# Patient Record
Sex: MALE | Race: WHITE | URBAN - METROPOLITAN AREA
[De-identification: names, ages, dates, MRNs, and addresses within clinical notes are randomized per-mention and may not be internally consistent; named-entity substitution may affect disease eponyms.]

---

## 2017-08-29 ENCOUNTER — MYC MEDICAL ADVICE (OUTPATIENT)
Dept: FAMILY MEDICINE | Facility: CLINIC | Age: 32
End: 2017-08-29

## 2017-09-01 ENCOUNTER — OFFICE VISIT (OUTPATIENT)
Dept: FAMILY MEDICINE | Facility: CLINIC | Age: 32
End: 2017-09-01
Payer: COMMERCIAL

## 2017-09-01 VITALS
HEART RATE: 84 BPM | DIASTOLIC BLOOD PRESSURE: 81 MMHG | HEIGHT: 75 IN | SYSTOLIC BLOOD PRESSURE: 133 MMHG | WEIGHT: 201 LBS | OXYGEN SATURATION: 97 % | TEMPERATURE: 96.6 F | BODY MASS INDEX: 24.99 KG/M2

## 2017-09-01 DIAGNOSIS — F32.1 MODERATE MAJOR DEPRESSION (H): Primary | ICD-10-CM

## 2017-09-01 PROCEDURE — 99214 OFFICE O/P EST MOD 30 MIN: CPT | Performed by: PHYSICIAN ASSISTANT

## 2017-09-01 ASSESSMENT — ANXIETY QUESTIONNAIRES
7. FEELING AFRAID AS IF SOMETHING AWFUL MIGHT HAPPEN: SEVERAL DAYS
3. WORRYING TOO MUCH ABOUT DIFFERENT THINGS: SEVERAL DAYS
IF YOU CHECKED OFF ANY PROBLEMS ON THIS QUESTIONNAIRE, HOW DIFFICULT HAVE THESE PROBLEMS MADE IT FOR YOU TO DO YOUR WORK, TAKE CARE OF THINGS AT HOME, OR GET ALONG WITH OTHER PEOPLE: SOMEWHAT DIFFICULT
GAD7 TOTAL SCORE: 11
6. BECOMING EASILY ANNOYED OR IRRITABLE: NEARLY EVERY DAY
2. NOT BEING ABLE TO STOP OR CONTROL WORRYING: NEARLY EVERY DAY
1. FEELING NERVOUS, ANXIOUS, OR ON EDGE: MORE THAN HALF THE DAYS
5. BEING SO RESTLESS THAT IT IS HARD TO SIT STILL: NOT AT ALL

## 2017-09-01 ASSESSMENT — PATIENT HEALTH QUESTIONNAIRE - PHQ9
SUM OF ALL RESPONSES TO PHQ QUESTIONS 1-9: 9
5. POOR APPETITE OR OVEREATING: SEVERAL DAYS

## 2017-09-01 NOTE — NURSING NOTE
"Chief Complaint   Patient presents with     Depression       Initial /81 (BP Location: Left arm, Patient Position: Sitting, Cuff Size: Adult Regular)  Pulse 84  Temp 96.6  F (35.9  C) (Oral)  Ht 6' 3.2\" (1.91 m)  Wt 201 lb (91.2 kg)  SpO2 97%  BMI 24.99 kg/m2 Estimated body mass index is 24.99 kg/(m^2) as calculated from the following:    Height as of this encounter: 6' 3.2\" (1.91 m).    Weight as of this encounter: 201 lb (91.2 kg).  Medication Reconciliation: complete   Barry THOMAS      "

## 2017-09-01 NOTE — MR AVS SNAPSHOT
After Visit Summary   9/1/2017    Darin Marie    MRN: [id number]           Patient Information     Date Of Birth          1985        Visit Information        Provider Department      9/1/2017 4:00 PM Niya Jimenez PA-C [facility]        Today's Diagnoses     Moderate major depression (H)    -  1      Care Instructions    Based on your medical history and these are the current health maintenance or preventive care services that you are due for (some may have been done at this visit)  Health Maintenance Due   Topic Date Due     TETANUS IMMUNIZATION (SYSTEM ASSIGNED)  04/02/2003     INFLUENZA VACCINE (SYSTEM ASSIGNED)  09/01/2017         At [facility], we strive to deliver an exceptional experience to you, every time we see you.    If you receive a survey in the mail, please send us back your thoughts. We really do value your feedback.    Your care team's suggested websites for health information:  Www.Skin[employer].org : Up to date and easily searchable information on multiple topics.  Www.medlineplus.gov : medication info, interactive tutorials, watch real surgeries online  Www.familydoctor.org : good info from the Academy of Family Physicians  Www.cdc.gov : public health info, travel advisories, epidemics (H1N1)  Www.aap.org : children's health info, normal development, vaccinations  Www.health.[facility].[place].us : [place] dept of health, public health issues in [place], N1N1    How to contact your care team:   Team [facility]irit [phone]         Pharmacy [phone]    Dr. Torres, Zoila Jarrett PA-C, Eloisa Boyd APRN CNP, Niya Jimenez PA-C, Dr. Cortes, and CHRISTY Kong CNP    Team RNs: María & Henny      Clinic hours  M-Th 7 am-7 pm   Fri 7 am-5 pm.   Urgent care M-F 11 am-9 pm,   Sat/Sun 9 am-5 pm.  Pharmacy M-Th 8 am-8 pm Fri 8 am-6 pm  Sat/Sun 9 am-5 pm.     All password changes, disabled accounts, or ID changes in  "MyChart/MyHealth will be done by our Access Services Department.    If you need help with your account or password, call: [phone]. Clinic staff no longer has the ability to change passwords.             Follow-ups after your visit        Who to contact     If you have questions or need follow up information about today's clinic visit or your schedule please contact [facility] directly at [phone].  Normal or non-critical lab and imaging results will be communicated to you by MyChart, letter or phone within 4 business days after the clinic has received the results. If you do not hear from us within 7 days, please contact the clinic through [employer] or phone. If you have a critical or abnormal lab result, we will notify you by phone as soon as possible.  Submit refill requests through [employer] or call your pharmacy and they will forward the refill request to us. Please allow 3 business days for your refill to be completed.          Additional Information About Your Visit        [employer] Information     [employer] gives you secure access to your electronic health record. If you see a primary care provider, you can also send messages to your care team and make appointments. If you have questions, please call your primary care clinic.  If you do not have a primary care provider, please call [phone] and they will assist you.        Care EveryWhere ID     This is your Care EveryWhere ID. This could be used by other organizations to access your [facility] medical records  [id number]        Your Vitals Were     Pulse Temperature Height Pulse Oximetry BMI (Body Mass Index)       84 96.6  F (35.9  C) (Oral) 6' 3.2\" (1.91 m) 97% 24.99 kg/m2        Blood Pressure from Last 3 Encounters:   09/01/17 133/81   11/25/16 125/84   10/28/16 117/81    Weight from Last 3 Encounters:   09/01/17 201 lb (91.2 kg)   11/25/16 220 lb (99.8 kg)   10/28/16 214 lb (97.1 kg)              Today, you had the following  "    No orders found for display         Today's Medication Changes          These changes are accurate as of: 9/1/17  4:40 PM.  If you have any questions, ask your nurse or doctor.               Start taking these medicines.        Dose/Directions    sertraline 50 MG tablet   Commonly known as:  ZOLOFT   Used for:  Moderate major depression (H)   Started by:  Niya Jimenez PA-C        Dose:  50 mg   Take 1 tablet (50 mg) by mouth daily   Quantity:  30 tablet   Refills:  0            Where to get your medicines      [place] [facility] [place] [address]  [address], [facility] [address]     Phone:  [phone]     sertraline 50 MG tablet                Primary Care Provider Office Phone # Fax #    Niya Jimenez PA-C [phone]       [address]  [facility] [address]        Equal Access to Services     AMPARO VELAZQUEZ : Hadii esme ku hadasho Soomaali, waaxda luqadaha, qaybta kaalmada adeegyada, waxay idiin hayaan david cain . So [facility] [phone].    ATENCIÓN: Si habla español, tiene a barr disposición servicios gratuitos de asistencia lingüística. Llame al [phone].    We comply with applicable federal civil rights laws and [place] laws. We do not discriminate on the basis of race, color, national origin, age, disability sex, sexual orientation or gender identity.            Thank you!     Thank you for choosing [facility]  for your care. Our goal is always to provide you with excellent care. Hearing back from our patients is one way we can continue to improve our services. Please take a few minutes to complete the written survey that you may receive in the mail after your visit with us. Thank you!             Your Updated Medication List - Protect others around you: Learn how to safely use, store and throw away your medicines at www.disposemymeds.org.          This list is  accurate as of: 9/1/17  4:40 PM.  Always use your most recent med list.                   Brand Name Dispense Instructions for use Diagnosis    sertraline 50 MG tablet    ZOLOFT    30 tablet    Take 1 tablet (50 mg) by mouth daily    Moderate major depression (H)

## 2017-09-01 NOTE — PATIENT INSTRUCTIONS
Based on your medical history and these are the current health maintenance or preventive care services that you are due for (some may have been done at this visit)  Health Maintenance Due   Topic Date Due     TETANUS IMMUNIZATION (SYSTEM ASSIGNED)  04/02/2003     INFLUENZA VACCINE (SYSTEM ASSIGNED)  09/01/2017         At [facility], we strive to deliver an exceptional experience to you, every time we see you.    If you receive a survey in the mail, please send us back your thoughts. We really do value your feedback.    Your care team's suggested websites for health information:  Www.[employer].org : Up to date and easily searchable information on multiple topics.  Www.medlineplus.gov : medication info, interactive tutorials, watch real surgeries online  Www.familydoctor.org : good info from the Academy of Family Physicians  Www.cdc.gov : public health info, travel advisories, epidemics (H1N1)  Www.aap.org : children's health info, normal development, vaccinations  Www.health.[facility].[place].us : [place] dept of health, public health issues in [place], N1N1    How to contact your care team:   Jono Foley/[facility] [phone]         Pharmacy [phone]    Dr. Torres, Zoila Jarrett PA-C, Dr. Ferrara, Eloisa HARRISON CNP, Niya Jimenez PA-C, Dr. Cortes, and CHRISTY Kong CNP    Team RNs: María & Henny      Clinic hours  M-Th 7 am-7 pm   Fri 7 am-5 pm.   Urgent care M-F 11 am-9 pm,   Sat/Sun 9 am-5 pm.  Pharmacy M-Th 8 am-8 pm Fri 8 am-6 pm  Sat/Sun 9 am-5 pm.     All password changes, disabled accounts, or ID changes in TRELYS/MyHealth will be done by our Access Services Department.    If you need help with your account or password, call: [phone]. Clinic staff no longer has the ability to change passwords.

## 2017-09-01 NOTE — PROGRESS NOTES
"  SUBJECTIVE:   Darin Marie is a 32 year old male who presents to clinic today for the following health issues:      Abnormal Mood Symptoms      Duration: 10 month    Description:  Depression: YES  Anxiety: YES  Panic attacks: no      Accompanying signs and symptoms: see PHQ-9 and JOSEPH scores    History (similar episodes/previous evaluation): None    Precipitating or alleviating factors: None    Therapies tried and outcome: none      He has been seeing a therapist who encouraged Darin to schedule an appt to start medication as counseling is not fully treating his depression.          Problem list and histories reviewed & adjusted, as indicated.  Additional history: as documented    Patient Active Problem List   Diagnosis     Genital warts     Moderate major depression (H)     No past surgical history on file.    Social History   Substance Use Topics     Smoking status: Never Smoker     Smokeless tobacco: Not on file     Alcohol use No     No family history on file.      Current Outpatient Prescriptions   Medication Sig Dispense Refill     sertraline (ZOLOFT) 50 MG tablet Take 1 tablet (50 mg) by mouth daily 30 tablet 0     BP Readings from Last 3 Encounters:   09/01/17 133/81   11/25/16 125/84   10/28/16 117/81    Wt Readings from Last 3 Encounters:   09/01/17 201 lb (91.2 kg)   11/25/16 220 lb (99.8 kg)   10/28/16 214 lb (97.1 kg)                        Reviewed and updated as needed this visit by clinical staff     Reviewed and updated as needed this visit by Provider         ROS:  Constitutional, HEENT, cardiovascular, pulmonary, gi and gu systems are negative, except as otherwise noted.      OBJECTIVE:   /81 (BP Location: Left arm, Patient Position: Sitting, Cuff Size: Adult Regular)  Pulse 84  Temp 96.6  F (35.9  C) (Oral)  Ht 6' 3.2\" (1.91 m)  Wt 201 lb (91.2 kg)  SpO2 97%  BMI 24.99 kg/m2  Body mass index is 24.99 kg/(m^2).  GENERAL: healthy, alert and no distress    Diagnostic Test " Results:  none     ASSESSMENT/PLAN:             1. Moderate major depression (H)  Depression    Will start medication today.  We discussed various treatment options, including pros and cons of each.  I discussed in detail possible side effects of medications and that the full benefits of medication may take 4-6 weeks, patient verbalized understanding.  See [facility] for orders.    We have discussed counseling as an adjuvent to medical treatment, patient will continue with counseling    Follow-up in 4 weeks (virtual visit ok).      Patient is to follow-up sooner should symptoms change or worsen.               - sertraline (ZOLOFT) 50 MG tablet; Take 1 tablet (50 mg) by mouth daily  Dispense: 30 tablet; Refill: 0        Niya Jimenez PA-C  [facility]

## 2017-09-02 ASSESSMENT — ANXIETY QUESTIONNAIRES: GAD7 TOTAL SCORE: 11

## 2017-09-25 ENCOUNTER — E-VISIT (OUTPATIENT)
Dept: FAMILY MEDICINE | Facility: CLINIC | Age: 32
End: 2017-09-25
Payer: COMMERCIAL

## 2017-09-25 DIAGNOSIS — F32.1 MODERATE MAJOR DEPRESSION (H): ICD-10-CM

## 2017-09-25 PROCEDURE — 98969 ZZC NONPHYSICIAN ONLINE ASSESSMENT AND MANAGEMENT: CPT | Performed by: PHYSICIAN ASSISTANT

## 2017-09-25 NOTE — MR AVS SNAPSHOT
After Visit Summary   9/25/2017    Darin Marie    MRN: [id number]           Patient Information     Date Of Birth          1985        Visit Information        Provider Department      9/25/2017 7:34 AM Niya Jimenez PA-C [facility]        Today's Diagnoses     Moderate major depression (H)           Follow-ups after your visit        Who to contact     If you have questions or need follow up information about today's clinic visit or your schedule please contact [facility] directly at [phone].  Normal or non-critical lab and imaging results will be communicated to you by [employer], letter or phone within 4 business days after the clinic has received the results. If you do not hear from us within 7 days, please contact the clinic through [employer] or phone. If you have a critical or abnormal lab result, we will notify you by phone as soon as possible.  Submit refill requests through [employer] or call your pharmacy and they will forward the refill request to us. Please allow 3 business days for your refill to be completed.          Additional Information About Your Visit        MyChart Information     [employer] gives you secure access to your electronic health record. If you see a primary care provider, you can also send messages to your care team and make appointments. If you have questions, please call your primary care clinic.  If you do not have a primary care provider, please call [phone] and they will assist you.        Care EveryWhere ID     This is your Care EveryWhere ID. This could be used by other organizations to access your [facility] medical records  [id number]         Blood Pressure from Last 3 Encounters:   09/01/17 133/81   11/25/16 125/84   10/28/16 117/81    Weight from Last 3 Encounters:   09/01/17 201 lb (91.2 kg)   11/25/16 220 lb (99.8 kg)   10/28/16 214 lb (97.1 kg)              Today, you had the following     No  orders found for display         Today's Medication Changes          These changes are accurate as of: 9/25/17 10:02 AM.  If you have any questions, ask your nurse or doctor.               These medicines have changed or have updated prescriptions.        Dose/Directions    sertraline 50 MG tablet   Commonly known as:  ZOLOFT   This may have changed:  how much to take   Used for:  Moderate major depression (H)   Changed by:  Niya Jimenez PA-C        Dose:  100 mg   Take 2 tablets (100 mg) by mouth daily   Quantity:  60 tablet   Refills:  1            Where to get your medicines      These [place] [facility] [place] - [address]  [address] [facility] [address]     Phone:  [phone]     sertraline 50 MG tablet                Primary Care Provider Office Phone # Fax #    Niya Jimenez PA-C [phone]       [address]  [facility] [address]        Equal Access to Services     [facility] HospitalEFRAIN : Hadii aad ku hadasho Soomaali, waaxda luqadaha, qaybta kaalmada adeegyada, waxay idiin hayaan daeeg kharalianna cain . So [facility] [phone].    ATENCIÓN: Si habla español, tiene a barr disposición servicios gratuitos de asistencia lingüística. LlOhioHealth [facility] [phone].    We comply with applicable federal civil rights laws and [place] laws. We do not discriminate on the basis of race, color, national origin, age, disability sex, sexual orientation or gender identity.            Thank you!     Thank you for choosing [facility]  for your care. Our goal is always to provide you with excellent care. Hearing back from our patients is one way we can continue to improve our services. Please take a few minutes to complete the written survey that you may receive in the mail after your visit with us. Thank you!             Your Updated Medication List - Protect others around you: Learn how to safely use, store and throw away  your medicines at www.disposemymeds.org.          This list is accurate as of: 9/25/17 10:02 AM.  Always use your most recent med list.                   Brand Name Dispense Instructions for use Diagnosis    sertraline 50 MG tablet    ZOLOFT    60 tablet    Take 2 tablets (100 mg) by mouth daily    Moderate major depression (H)

## 2017-11-12 ENCOUNTER — OFFICE VISIT (OUTPATIENT)
Dept: URGENT CARE | Facility: URGENT CARE | Age: 32
End: 2017-11-12
Payer: COMMERCIAL

## 2017-11-12 VITALS
SYSTOLIC BLOOD PRESSURE: 141 MMHG | BODY MASS INDEX: 24.49 KG/M2 | TEMPERATURE: 98.5 F | OXYGEN SATURATION: 97 % | WEIGHT: 197 LBS | DIASTOLIC BLOOD PRESSURE: 85 MMHG | HEART RATE: 92 BPM

## 2017-11-12 DIAGNOSIS — N34.2 URETHRITIS: Primary | ICD-10-CM

## 2017-11-12 DIAGNOSIS — H10.33 ACUTE BACTERIAL CONJUNCTIVITIS OF BOTH EYES: ICD-10-CM

## 2017-11-12 LAB
ALBUMIN UR-MCNC: ABNORMAL MG/DL
APPEARANCE UR: ABNORMAL
BACTERIA #/AREA URNS HPF: ABNORMAL /HPF
BILIRUB UR QL STRIP: ABNORMAL
COLOR UR AUTO: YELLOW
GLUCOSE UR STRIP-MCNC: NEGATIVE MG/DL
HGB UR QL STRIP: NEGATIVE
KETONES UR STRIP-MCNC: 15 MG/DL
LEUKOCYTE ESTERASE UR QL STRIP: NEGATIVE
MUCOUS THREADS #/AREA URNS LPF: PRESENT /LPF
NITRATE UR QL: NEGATIVE
PH UR STRIP: 7.5 PH (ref 5–7)
RBC #/AREA URNS AUTO: ABNORMAL /HPF
SOURCE: ABNORMAL
SP GR UR STRIP: 1.02 (ref 1–1.03)
UROBILINOGEN UR STRIP-ACNC: 0.2 EU/DL (ref 0.2–1)
WBC #/AREA URNS AUTO: ABNORMAL /HPF

## 2017-11-12 PROCEDURE — 87389 HIV-1 AG W/HIV-1&-2 AB AG IA: CPT | Performed by: FAMILY MEDICINE

## 2017-11-12 PROCEDURE — 87491 CHLMYD TRACH DNA AMP PROBE: CPT | Performed by: FAMILY MEDICINE

## 2017-11-12 PROCEDURE — 81001 URINALYSIS AUTO W/SCOPE: CPT | Performed by: FAMILY MEDICINE

## 2017-11-12 PROCEDURE — 87591 N.GONORRHOEAE DNA AMP PROB: CPT | Performed by: FAMILY MEDICINE

## 2017-11-12 PROCEDURE — 99214 OFFICE O/P EST MOD 30 MIN: CPT | Performed by: FAMILY MEDICINE

## 2017-11-12 PROCEDURE — 36415 COLL VENOUS BLD VENIPUNCTURE: CPT | Performed by: FAMILY MEDICINE

## 2017-11-12 PROCEDURE — 87086 URINE CULTURE/COLONY COUNT: CPT | Performed by: FAMILY MEDICINE

## 2017-11-12 PROCEDURE — 86780 TREPONEMA PALLIDUM: CPT | Performed by: FAMILY MEDICINE

## 2017-11-12 RX ORDER — CEFTRIAXONE SODIUM 250 MG/1
250 INJECTION, POWDER, FOR SOLUTION INTRAMUSCULAR; INTRAVENOUS ONCE
Qty: 1.25 ML | Refills: 0 | OUTPATIENT
Start: 2017-11-12 | End: 2017-11-12

## 2017-11-12 RX ORDER — GENTAMICIN SULFATE 3 MG/ML
1 SOLUTION/ DROPS OPHTHALMIC EVERY 4 HOURS
Qty: 5 ML | Refills: 0 | Status: SHIPPED | OUTPATIENT
Start: 2017-11-12 | End: 2017-11-19

## 2017-11-12 RX ORDER — POLYMYXIN B SULFATE AND TRIMETHOPRIM 1; 10000 MG/ML; [USP'U]/ML
SOLUTION OPHTHALMIC
Refills: 0 | COMMUNITY
Start: 2017-11-09

## 2017-11-12 RX ORDER — AZITHROMYCIN 500 MG/1
1000 TABLET, FILM COATED ORAL ONCE
Qty: 2 TABLET | Refills: 0 | Status: SHIPPED | OUTPATIENT
Start: 2017-11-12 | End: 2017-11-12

## 2017-11-12 NOTE — NURSING NOTE
The following medication was given:     MEDICATION: Rocephin 250 mg and Lidocaine 1cc  ROUTE: IM  SITE: Ventrogluteal - Right  DOSE: 250mg  LOT #: [id number]  :  [employer] for [employer]  EXPIRATION DATE:  3-1-20  NDC#: 2581-2616-42    Lluvia Prieto MA

## 2017-11-12 NOTE — PROGRESS NOTES
SUBJECTIVE:   Darin Marie is a 32 year old male who presents to clinic today for the following health issues:      Eye(s) Problem      Duration: Since this past Thursday    Description:  Location: bilateral  Pain: YES- feels like constant dry eyes  Redness: YES  Discharge: YES- in the am very crusty and liquidy    Accompanying signs and symptoms: redness, swelling, discharge    History (Trauma, foreign body exposure,): Yes - Dx w/ pink eye, on Wed felt something was in his eye    Precipitating or alleviating factors (contact use): None    Therapies tried and outcome: Antibiotic eye drops    The patient does not wear corrective lenses  Right Eye 20/50   Left Eye 20/20   Both Eyes 20/20           Genitourinary symptoms      Duration: Since this past Thursday    Description:  dysuria and discharge    Intensity:  8/10 pain on urination    Accompanying signs and symptoms (fever/discharge/nausea/vomiting/back or abdominal pain):  White milky discharge, pain    History (frequent UTI's/kidney stones/prostate problems): None  Sexually active: YES- multiple partners, some with protection some not    Precipitating or alleviating factors: None - not going to the bathroom makes things better    Therapies tried and outcome: none   Outcome: n/a    Sexually active, 3 sexual partners in last month, 15 overall in his lifetime, use condoms sometimes, heterosexual, no anal intercourse, no STD, teacher by profession, denies smoking cigarette, drinks alcohol occasionally         Problem list and histories reviewed & adjusted, as indicated.  Additional history: as documented    Patient Active Problem List   Diagnosis     Genital warts     Moderate major depression (H)     No past surgical history on file.    Social History   Substance Use Topics     Smoking status: Never Smoker     Smokeless tobacco: Not on file     Alcohol use No     No family history on file.      Current Outpatient Prescriptions   Medication Sig Dispense  Refill     trimethoprim-polymyxin b (POLYTRIM) ophthalmic solution   0     sertraline (ZOLOFT) 50 MG tablet Take 2 tablets (100 mg) by mouth daily 60 tablet 1     Allergies   Allergen Reactions     Penicillins      REACTION UNKNOWN     No lab results found.   BP Readings from Last 3 Encounters:   11/12/17 141/85   09/01/17 133/81   11/25/16 125/84    Wt Readings from Last 3 Encounters:   11/12/17 197 lb (89.4 kg)   09/01/17 201 lb (91.2 kg)   11/25/16 220 lb (99.8 kg)                  Labs reviewed in EPIC          Reviewed and updated as needed this visit by clinical staffTobacco  Allergies  Meds       Reviewed and updated as needed this visit by Provider         ROS:   ROS: 10 point ROS neg other than the symptoms noted above in the HPI.      OBJECTIVE:   /85 (BP Location: Left arm, Patient Position: Chair, Cuff Size: Adult Regular)  Pulse 92  Temp 98.5  F (36.9  C) (Oral)  Wt 197 lb (89.4 kg)  SpO2 97%  BMI 24.49 kg/m2  Body mass index is 24.49 kg/(m^2).  GENERAL: healthy, alert and no distress  EYES: PERRL, EOMI and conjunctiva/corneas- conjunctival injection and watering discharge bilaterally   NECK: no adenopathy, no asymmetry, masses, or scars and thyroid normal to palpation  RESP: lungs clear to auscultation - no rales, rhonchi or wheezes  CV: regular rate and rhythm, normal S1 S2, no S3 or S4, no murmur, click or rub, no peripheral edema and peripheral pulses strong  ABDOMEN: soft, nontender, no hepatosplenomegaly, no masses and bowel sounds normal   (male): testicles normal without atrophy or masses and light yellowish penile discharge, no genital lesions or lymphadenopathy noted   MS: no gross musculoskeletal defects noted, no edema  NEURO: Normal strength and tone, mentation intact and speech normal    Diagnostic Test Results:  Results for orders placed or performed in visit on 11/12/17 (from the past 24 hour(s))   *UA reflex to Microscopic and Culture ([facility] (except  [place] and [place])   Result Value Ref Range    Color Urine Yellow     Appearance Urine Slightly Cloudy     Glucose Urine Negative NEG^Negative mg/dL    Bilirubin Urine Small (A) NEG^Negative    Ketones Urine 15 (A) NEG^Negative mg/dL    Specific Gravity Urine 1.020 1.003 - 1.035    Blood Urine Negative NEG^Negative    pH Urine 7.5 (H) 5.0 - 7.0 pH    Protein Albumin Urine Trace (A) NEG^Negative mg/dL    Urobilinogen Urine 0.2 0.2 - 1.0 EU/dL    Nitrite Urine Negative NEG^Negative    Leukocyte Esterase Urine Negative NEG^Negative    Source Midstream Urine    Urine Microscopic   Result Value Ref Range    WBC Urine  (A) OTO2^O - 2 /HPF    RBC Urine O - 2 OTO2^O - 2 /HPF    Bacteria Urine Moderate (A) NEG^Negative /HPF    Mucous Urine Present (A) NEG^Negative /LPF       ASSESSMENT/PLAN:       1. Urethritis  - Symptoms likely related to STD, ceftriaxone given and azithromycin ordered   - Written information provided as below  - Follow up with PCP next week   - *UA reflex to Microscopic and Culture ([facility] (except [place] and [place])  - Urine Microscopic  - Urine Culture Aerobic Bacterial  - Chlamydia/gonorhea PCR   - cefTRIAXone (ROCEPHIN) 250 MG injection; Inject 250 mg into the muscle once for 1 dose  Dispense: 1.25 mL; Refill: 0  - azithromycin (ZITHROMAX) 500 MG tablet; Take 2 tablets (1,000 mg) by mouth once for 1 dose  Dispense: 2 tablet; Refill: 0      2. Acute bacterial conjunctivitis of both eyes  - Possibly STD related   - Gentamicin drops prescribed, suggested warm compresses and regular eye wash   - gentamicin (GARAMYCIN) 0.3 % ophthalmic solution; Apply 1 drop to eye every 4 hours for 7 days  Dispense: 5 mL; Refill: 0      MEDICATIONS:   Orders Placed This Encounter   Medications     trimethoprim-polymyxin b (POLYTRIM) ophthalmic solution     Refill:  0     cefTRIAXone (ROCEPHIN) 250 MG injection     Sig: Inject 250 mg into the muscle once for 1 dose     Dispense:  1.25  mL     Refill:  0     azithromycin (ZITHROMAX) 500 MG tablet     Sig: Take 2 tablets (1,000 mg) by mouth once for 1 dose     Dispense:  2 tablet     Refill:  0     gentamicin (GARAMYCIN) 0.3 % ophthalmic solution     Sig: Apply 1 drop to eye every 4 hours for 7 days     Dispense:  5 mL     Refill:  0     Patient Instructions     You and sexual partners should be abstaining from sexual activity until the therapeutic course of antibiotics is completed.   Your partner should be getting treatment as well.   You should have STD screen retesting in 3 months.   Follow up with PCP next week or earlier if needed.     Urethritis in Men     An inflamed urethra can cause pain during urination.   The urethra is the tube that runs from the bladder through the penis. When the urethra is inflamed, it is called urethritis. The urethra becomes swollen and causes burning pain when you urinate. Other symptoms of urethritis may include itching or tingling of the penis or pus discharge from the penis. You may also have pain with sex and masturbation. Some men may not have symptoms.  What causes urethritis?  Urethritis can be caused by a bacterial or viral infection. Such an infection can lead to conditions such as a urinary tract infection (UTI) or sexually transmitted diseases (STD). Urethritis can also be caused by injury or sensitivity or allergy to chemicals in lotions and other products.  How is urethritis diagnosed?  Your healthcare provider will examine you and ask about your symptoms and health history. You may also have one or more of the following tests:    Urine test to take samples of urine and have them checked for problems.    Blood test to take a sample of blood and have it checked for problems.    Urethral discharge to take a sample of fluid from inside the urethra. A cotton swab is inserted into the opening of the penis and into the urethra.    Cystoscopy to allow the healthcare provider to look for problems in the  urinary tract. The test uses a thin, flexible telescope called a cystoscope with a light and camera attached. The scope is inserted into the urethra.  How is urethritis treated?  Treatment depends on the cause of urethritis. If it s due to a bacterial infection, antibiotics (medicines that fight infection) will be given. Your healthcare provider can tell you more about your treatment options. In the meantime, your symptoms can be treated. To relieve pain and swelling, anti-inflammatory medications, such as ibuprofen, may be given. Untreated, symptoms may get worse. Also, scar tissue can form in the urethra, causing it to narrow.  When to call your healthcare provider   Call your healthcare provider right away if you have any of the following:    Fever of 100.4 F (38.0 C) or higher     Blood in the urine or semen    Burning pain with urination    Increased urge to urinate    Discharge from the penis    Itching, tenderness, or swelling in the penis or groin    Pain during sex or masturbation   Preventing STDs  When it comes to sex, it s important to take care and be safe. Any sexual contact with the penis, vagina, anus, or mouth can spread an STD. The only sure way to prevent STDs is not having sex. But there are ways to make sex safer. Use a latex condom each time you have sex. And talk to your partner about STDs before you have sex.  Date Last Reviewed: 1/1/2017 2000-2017 [employer]. [address] [place], [place] [id number]. All rights reserved. This information is not intended as a substitute for professional medical care. Always follow your healthcare professional's instructions.        Urethritis Due to Gonorrhea or Chlamydia (Adult male)    You have urethritis. This is an inflammation in the urethra. The urethra is the tube between the bladder and the tip of the penis. Urine drains out of the body through the urethra. There are 2 main types of this condition:    Gonococcal urethritis () is  an infection caused by gonorrhea.    Non-gonococcal urethritis (MILO) is an infection that is usually caused by chlamydia. Other infections can also be the cause.  Women often have no symptoms. Men are more likely to have symptoms, but may not. Symptoms can start within 1 week after infection, but can take a month or more, if they even occur. Some symptoms are:    Burning or pain when urinating    Irritation in the penis    Pus discharge from the penis    Pain and possible swelling in one or both testicles  Infections in the urethra are usually caused by sexually transmitted diseases, or STDs. The most common infections are gonorrhea, chlamydia, or both.  Gonorrhea infections  Gonococcal urethritis () is an infection of the urethra. It is caused by gonorrhea. Gonorrhea is a sexually transmitted infection (STI). Gonorrhea can also be in other areas of the body. This can cause:    Rectal pain and discharge    Throat infection    Eye infections (conjunctivitis)  Without treatment, the infection can get worse and spread to other parts of your body. The infection can cause rashes, arthritis, and infections in your joints, heart, and brain.  Non-gonococcal infections, or MILO infections  Non-gonococcal urethritis (MILO) is an infection of the urethra. It is usually caused by chlamydia. Symptoms may clear up in a few weeks or months, even without treatment. However, without treatment, the bacteria that cause MILO can stay in the urethra. This means that even if symptoms clear, you can still have an infection. You can spread it to others if you are not treated.  Urethritis caused by an infection can be cured, but it needs to be treated with antibiotics. If you don't get treated, you can give it to someone else. If you give it to a woman, it can cause a serious pelvic infection and infertility.  It is important to remember that you can have an infection without symptoms. For this reason, your sexual partner(s) needs to be  treated, even if he or she has no symptoms. If he or she is not treated, and you continue to have sex, you will be infected again. Your partner(s) should contact his or her own healthcare provider to be examined and treated. An urgent care clinic or the Public Health Department can also do this.  Home care  The following guidelines will help you care for yourself at home:    Take all the antibiotics you were given until they are used up. It is important to finish them, even if you are feeling better. This ensures the infection is completely cleared up.    No sex until both you and your partner(s) have finished all the antibiotics, and your healthcare provider says you are no longer contagious.    You can take acetaminophen or ibuprofen for pain, unless you were given a different pain medicine. If you have chronic liver or kidney disease or have ever had a stomach ulcer or GI bleeding, or are taking blood thinners, talk with your healthcare provider before using these medicines.    Aspirin should never be used in anyone under 18 years of age who has a fever.    Learn about and use safe sex practices. The safest sex is with a partner who has tested negative and only has sex with you. Condoms can keep some STDs from spreading, including gonorrhea, chlamydia and HIV, but are not a guarantee.  Follow-up care  Follow up with your healthcare provider, or as advised. If a culture test was taken, you may call for the results as directed. Another culture test should be done 4 to 6 weeks after treatment to be sure the infection is gone. Follow up with your healthcare provider or the Public Health Department for a complete STD screening, including HIV testing. For more information about STDs, contact CDC-INFO at 501-416-5627.  When to seek medical advice  Call your healthcare provider right away if any of these occur:    No improvement after 3 days of treatment, although you can have some symptoms longer    Unable to  urinate    Rash or joint pain    Painful sores on the penis    Enlarged painful lymph nodes (lumps) in the groin    Testicle pain or swelling of the scrotum  Date Last Reviewed: 10/1/2016    2823-2499 [employer]. [address] [place] [address]. All rights reserved. This information is not intended as a substitute for professional medical care. Always follow your healthcare professional's instructions.        Conjunctivitis, Bacterial    You have an infection in the membranes covering the white part of the eye. This part of the eye is called the conjunctiva. The infection is called conjunctivitis. The most common symptoms of conjunctivitis include a thick, pus-like discharge from the eye, swollen eyelids, redness, eyelids sticking together upon awakening, and a gritty or scratchy feeling in the eye. Your infection was caused by bacteria. It may be treated with medicine. With treatment, the infection takes about 7 to 10 days to resolve.  Home care    Use prescribed antibiotic eye drops or ointment as directed to treat the infection.    Apply a warm compress (towel soaked in warm water) to the affected eye 3 to 4 times a day. Do this just before applying medicine to the eye.    Use a warm, wet cloth to wipe away crusting of the eyelids in the morning. This is caused by mucus drainage during the night. You may also use saline irrigating solution or artificial tears to rinse away mucus in the eye. Do not put a patch over the eye.    Wash your hands before and after touching the infected eye. This is to prevent spreading the infection to the other eye, and to other people. Don't share your towels or washcloths with others.    You may use acetaminophen or ibuprofen to control pain, unless another medicine was prescribed. (Note: If you have chronic liver or kidney disease or have ever had a stomach ulcer or gastrointestinal bleeding, talk with your doctor before using these medicines.)    Don't  wear contact lenses until your eyes have healed and all symptoms are gone.  Follow-up care  Follow up with your healthcare provider, or as advised.  When to seek medical advice  Call your healthcare provider right away if any of these occur:    Worsening vision    Increasing pain in the eye    Increasing swelling or redness of the eyelid    Redness spreading around the eye  Date Last Reviewed: 6/14/2015 2000-2017 [employer]. [address] [place] [address]. All rights reserved. This information is not intended as a substitute for professional medical care. Always follow your healthcare professional's instructions.            Bert Ray MD  [facility]

## 2017-11-12 NOTE — NURSING NOTE
"Chief Complaint   Patient presents with     Conjunctivitis     dx this past Thursday via virtual site, got rx, eyes have not gotten any better, same if not worse     Urinary Problem     pain when he urinates, some milky cloudy discharge, symptoms present since Thursday as well, no blood in urine       Initial /85 (BP Location: Left arm, Patient Position: Chair, Cuff Size: Adult Regular)  Pulse 92  Temp 98.5  F (36.9  C) (Oral)  Wt 197 lb (89.4 kg)  SpO2 97%  BMI 24.49 kg/m2 Estimated body mass index is 24.49 kg/(m^2) as calculated from the following:    Height as of 9/1/17: 6' 3.2\" (1.91 m).    Weight as of this encounter: 197 lb (89.4 kg).  Medication Reconciliation: complete   Lluvia Prieto MA    "

## 2017-11-12 NOTE — PATIENT INSTRUCTIONS
You and sexual partners should be abstaining from sexual activity until the therapeutic course of antibiotics is completed.   Your partner should be getting treatment as well.   You should have STD screen retesting in 3 months.   Follow up with PCP next week or earlier if needed.     Urethritis in Men     An inflamed urethra can cause pain during urination.   The urethra is the tube that runs from the bladder through the penis. When the urethra is inflamed, it is called urethritis. The urethra becomes swollen and causes burning pain when you urinate. Other symptoms of urethritis may include itching or tingling of the penis or pus discharge from the penis. You may also have pain with sex and masturbation. Some men may not have symptoms.  What causes urethritis?  Urethritis can be caused by a bacterial or viral infection. Such an infection can lead to conditions such as a urinary tract infection (UTI) or sexually transmitted diseases (STD). Urethritis can also be caused by injury or sensitivity or allergy to chemicals in lotions and other products.  How is urethritis diagnosed?  Your healthcare provider will examine you and ask about your symptoms and health history. You may also have one or more of the following tests:    Urine test to take samples of urine and have them checked for problems.    Blood test to take a sample of blood and have it checked for problems.    Urethral discharge to take a sample of fluid from inside the urethra. A cotton swab is inserted into the opening of the penis and into the urethra.    Cystoscopy to allow the healthcare provider to look for problems in the urinary tract. The test uses a thin, flexible telescope called a cystoscope with a light and camera attached. The scope is inserted into the urethra.  How is urethritis treated?  Treatment depends on the cause of urethritis. If it s due to a bacterial infection, antibiotics (medicines that fight infection) will be given. Your  healthcare provider can tell you more about your treatment options. In the meantime, your symptoms can be treated. To relieve pain and swelling, anti-inflammatory medications, such as ibuprofen, may be given. Untreated, symptoms may get worse. Also, scar tissue can form in the urethra, causing it to narrow.  When to call your healthcare provider   Call your healthcare provider right away if you have any of the following:    Fever of 100.4 F (38.0 C) or higher     Blood in the urine or semen    Burning pain with urination    Increased urge to urinate    Discharge from the penis    Itching, tenderness, or swelling in the penis or groin    Pain during sex or masturbation   Preventing STDs  When it comes to sex, it s important to take care and be safe. Any sexual contact with the penis, vagina, anus, or mouth can spread an STD. The only sure way to prevent STDs is not having sex. But there are ways to make sex safer. Use a latex condom each time you have sex. And talk to your partner about STDs before you have sex.  Date Last Reviewed: 1/1/2017 2000-2017 [employer]. [address] [place] [address]. All rights reserved. This information is not intended as a substitute for professional medical care. Always follow your healthcare professional's instructions.        Urethritis Due to Gonorrhea or Chlamydia (Adult male)    You have urethritis. This is an inflammation in the urethra. The urethra is the tube between the bladder and the tip of the penis. Urine drains out of the body through the urethra. There are 2 main types of this condition:    Gonococcal urethritis () is an infection caused by gonorrhea.    Non-gonococcal urethritis (MILO) is an infection that is usually caused by chlamydia. Other infections can also be the cause.  Women often have no symptoms. Men are more likely to have symptoms, but may not. Symptoms can start within 1 week after infection, but can take a month or more, if  they even occur. Some symptoms are:    Burning or pain when urinating    Irritation in the penis    Pus discharge from the penis    Pain and possible swelling in one or both testicles  Infections in the urethra are usually caused by sexually transmitted diseases, or STDs. The most common infections are gonorrhea, chlamydia, or both.  Gonorrhea infections  Gonococcal urethritis () is an infection of the urethra. It is caused by gonorrhea. Gonorrhea is a sexually transmitted infection (STI). Gonorrhea can also be in other areas of the body. This can cause:    Rectal pain and discharge    Throat infection    Eye infections (conjunctivitis)  Without treatment, the infection can get worse and spread to other parts of your body. The infection can cause rashes, arthritis, and infections in your joints, heart, and brain.  Non-gonococcal infections, or MILO infections  Non-gonococcal urethritis (MILO) is an infection of the urethra. It is usually caused by chlamydia. Symptoms may clear up in a few weeks or months, even without treatment. However, without treatment, the bacteria that cause MILO can stay in the urethra. This means that even if symptoms clear, you can still have an infection. You can spread it to others if you are not treated.  Urethritis caused by an infection can be cured, but it needs to be treated with antibiotics. If you don't get treated, you can give it to someone else. If you give it to a woman, it can cause a serious pelvic infection and infertility.  It is important to remember that you can have an infection without symptoms. For this reason, your sexual partner(s) needs to be treated, even if he or she has no symptoms. If he or she is not treated, and you continue to have sex, you will be infected again. Your partner(s) should contact his or her own healthcare provider to be examined and treated. An urgent care clinic or the Public Health Department can also do this.  Home care  The following  guidelines will help you care for yourself at home:    Take all the antibiotics you were given until they are used up. It is important to finish them, even if you are feeling better. This ensures the infection is completely cleared up.    No sex until both you and your partner(s) have finished all the antibiotics, and your healthcare provider says you are no longer contagious.    You can take acetaminophen or ibuprofen for pain, unless you were given a different pain medicine. If you have chronic liver or kidney disease or have ever had a stomach ulcer or GI bleeding, or are taking blood thinners, talk with your healthcare provider before using these medicines.    Aspirin should never be used in anyone under 18 years of age who has a fever.    Learn about and use safe sex practices. The safest sex is with a partner who has tested negative and only has sex with you. Condoms can keep some STDs from spreading, including gonorrhea, chlamydia and HIV, but are not a guarantee.  Follow-up care  Follow up with your healthcare provider, or as advised. If a culture test was taken, you may call for the results as directed. Another culture test should be done 4 to 6 weeks after treatment to be sure the infection is gone. Follow up with your healthcare provider or the Public Health Department for a complete STD screening, including HIV testing. For more information about STDs, contact CDC-INFO at 051-740-1244.  When to seek medical advice  Call your healthcare provider right away if any of these occur:    No improvement after 3 days of treatment, although you can have some symptoms longer    Unable to urinate    Rash or joint pain    Painful sores on the penis    Enlarged painful lymph nodes (lumps) in the groin    Testicle pain or swelling of the scrotum  Date Last Reviewed: 10/1/2016    9785-8092 [employer]. [address] [place] [address], [place] [address]. All rights reserved. This information is not intended as a  substitute for professional medical care. Always follow your healthcare professional's instructions.        Conjunctivitis, Bacterial    You have an infection in the membranes covering the white part of the eye. This part of the eye is called the conjunctiva. The infection is called conjunctivitis. The most common symptoms of conjunctivitis include a thick, pus-like discharge from the eye, swollen eyelids, redness, eyelids sticking together upon awakening, and a gritty or scratchy feeling in the eye. Your infection was caused by bacteria. It may be treated with medicine. With treatment, the infection takes about 7 to 10 days to resolve.  Home care    Use prescribed antibiotic eye drops or ointment as directed to treat the infection.    Apply a warm compress (towel soaked in warm water) to the affected eye 3 to 4 times a day. Do this just before applying medicine to the eye.    Use a warm, wet cloth to wipe away crusting of the eyelids in the morning. This is caused by mucus drainage during the night. You may also use saline irrigating solution or artificial tears to rinse away mucus in the eye. Do not put a patch over the eye.    Wash your hands before and after touching the infected eye. This is to prevent spreading the infection to the other eye, and to other people. Don't share your towels or washcloths with others.    You may use acetaminophen or ibuprofen to control pain, unless another medicine was prescribed. (Note: If you have chronic liver or kidney disease or have ever had a stomach ulcer or gastrointestinal bleeding, talk with your doctor before using these medicines.)    Don't wear contact lenses until your eyes have healed and all symptoms are gone.  Follow-up care  Follow up with your healthcare provider, or as advised.  When to seek medical advice  Call your healthcare provider right away if any of these occur:    Worsening vision    Increasing pain in the eye    Increasing swelling or redness of the  eyelid    Redness spreading around the eye  Date Last Reviewed: 6/14/2015 2000-2017 [employer]. [address] [place] [address], [place] [address]. All rights reserved. This information is not intended as a substitute for professional medical care. Always follow your healthcare professional's instructions.

## 2017-11-12 NOTE — MR AVS SNAPSHOT
After Visit Summary   11/12/2017    Darin Marie    MRN: [id number]           Patient Information     Date Of Birth          1985        Visit Information        Provider Department      11/12/2017 10:05 AM Bert Ray MD [facility]        Today's Diagnoses     Urethritis    -  1    Dysuria        Nonspecific finding on examination of urine        Acute bacterial conjunctivitis of both eyes          Care Instructions    You and sexual partners should be abstaining from sexual activity until the therapeutic course of antibiotics is completed.   Your partner should be getting treatment as well.   You should have STD screen retesting in 3 months.   Follow up with PCP next week or earlier if needed.     Urethritis in Men     An inflamed urethra can cause pain during urination.   The urethra is the tube that runs from the bladder through the penis. When the urethra is inflamed, it is called urethritis. The urethra becomes swollen and causes burning pain when you urinate. Other symptoms of urethritis may include itching or tingling of the penis or pus discharge from the penis. You may also have pain with sex and masturbation. Some men may not have symptoms.  What causes urethritis?  Urethritis can be caused by a bacterial or viral infection. Such an infection can lead to conditions such as a urinary tract infection (UTI) or sexually transmitted diseases (STD). Urethritis can also be caused by injury or sensitivity or allergy to chemicals in lotions and other products.  How is urethritis diagnosed?  Your healthcare provider will examine you and ask about your symptoms and health history. You may also have one or more of the following tests:    Urine test to take samples of urine and have them checked for problems.    Blood test to take a sample of blood and have it checked for problems.    Urethral discharge to take a sample of fluid from inside the urethra. A cotton swab  is inserted into the opening of the penis and into the urethra.    Cystoscopy to allow the healthcare provider to look for problems in the urinary tract. The test uses a thin, flexible telescope called a cystoscope with a light and camera attached. The scope is inserted into the urethra.  How is urethritis treated?  Treatment depends on the cause of urethritis. If it s due to a bacterial infection, antibiotics (medicines that fight infection) will be given. Your healthcare provider can tell you more about your treatment options. In the meantime, your symptoms can be treated. To relieve pain and swelling, anti-inflammatory medications, such as ibuprofen, may be given. Untreated, symptoms may get worse. Also, scar tissue can form in the urethra, causing it to narrow.  When to call your healthcare provider   Call your healthcare provider right away if you have any of the following:    Fever of 100.4 F (38.0 C) or higher     Blood in the urine or semen    Burning pain with urination    Increased urge to urinate    Discharge from the penis    Itching, tenderness, or swelling in the penis or groin    Pain during sex or masturbation   Preventing STDs  When it comes to sex, it s important to take care and be safe. Any sexual contact with the penis, vagina, anus, or mouth can spread an STD. The only sure way to prevent STDs is not having sex. But there are ways to make sex safer. Use a latex condom each time you have sex. And talk to your partner about STDs before you have sex.  Date Last Reviewed: 1/1/2017 2000-2017 [employer]. [address] [place] [address], [place] [address]. All rights reserved. This information is not intended as a substitute for professional medical care. Always follow your healthcare professional's instructions.        Urethritis Due to Gonorrhea or Chlamydia (Adult male)    You have urethritis. This is an inflammation in the urethra. The urethra is the tube between the bladder and the tip  of the penis. Urine drains out of the body through the urethra. There are 2 main types of this condition:    Gonococcal urethritis () is an infection caused by gonorrhea.    Non-gonococcal urethritis (MILO) is an infection that is usually caused by chlamydia. Other infections can also be the cause.  Women often have no symptoms. Men are more likely to have symptoms, but may not. Symptoms can start within 1 week after infection, but can take a month or more, if they even occur. Some symptoms are:    Burning or pain when urinating    Irritation in the penis    Pus discharge from the penis    Pain and possible swelling in one or both testicles  Infections in the urethra are usually caused by sexually transmitted diseases, or STDs. The most common infections are gonorrhea, chlamydia, or both.  Gonorrhea infections  Gonococcal urethritis () is an infection of the urethra. It is caused by gonorrhea. Gonorrhea is a sexually transmitted infection (STI). Gonorrhea can also be in other areas of the body. This can cause:    Rectal pain and discharge    Throat infection    Eye infections (conjunctivitis)  Without treatment, the infection can get worse and spread to other parts of your body. The infection can cause rashes, arthritis, and infections in your joints, heart, and brain.  Non-gonococcal infections, or MILO infections  Non-gonococcal urethritis (MILO) is an infection of the urethra. It is usually caused by chlamydia. Symptoms may clear up in a few weeks or months, even without treatment. However, without treatment, the bacteria that cause MILO can stay in the urethra. This means that even if symptoms clear, you can still have an infection. You can spread it to others if you are not treated.  Urethritis caused by an infection can be cured, but it needs to be treated with antibiotics. If you don't get treated, you can give it to someone else. If you give it to a woman, it can cause a serious pelvic infection and  infertility.  It is important to remember that you can have an infection without symptoms. For this reason, your sexual partner(s) needs to be treated, even if he or she has no symptoms. If he or she is not treated, and you continue to have sex, you will be infected again. Your partner(s) should contact his or her own healthcare provider to be examined and treated. An urgent care clinic or the Public Health Department can also do this.  Home care  The following guidelines will help you care for yourself at home:    Take all the antibiotics you were given until they are used up. It is important to finish them, even if you are feeling better. This ensures the infection is completely cleared up.    No sex until both you and your partner(s) have finished all the antibiotics, and your healthcare provider says you are no longer contagious.    You can take acetaminophen or ibuprofen for pain, unless you were given a different pain medicine. If you have chronic liver or kidney disease or have ever had a stomach ulcer or GI bleeding, or are taking blood thinners, talk with your healthcare provider before using these medicines.    Aspirin should never be used in anyone under 18 years of age who has a fever.    Learn about and use safe sex practices. The safest sex is with a partner who has tested negative and only has sex with you. Condoms can keep some STDs from spreading, including gonorrhea, chlamydia and HIV, but are not a guarantee.  Follow-up care  Follow up with your healthcare provider, or as advised. If a culture test was taken, you may call for the results as directed. Another culture test should be done 4 to 6 weeks after treatment to be sure the infection is gone. Follow up with your healthcare provider or the Public Health Department for a complete STD screening, including HIV testing. For more information about STDs, contact CDC-INFO at 777-839-4713.  When to seek medical advice  Call your healthcare provider  right away if any of these occur:    No improvement after 3 days of treatment, although you can have some symptoms longer    Unable to urinate    Rash or joint pain    Painful sores on the penis    Enlarged painful lymph nodes (lumps) in the groin    Testicle pain or swelling of the scrotum  Date Last Reviewed: 10/1/2016    9795-0425 [employer]. [address] [place] [address]. All rights reserved. This information is not intended as a substitute for professional medical care. Always follow your healthcare professional's instructions.        Conjunctivitis, Bacterial    You have an infection in the membranes covering the white part of the eye. This part of the eye is called the conjunctiva. The infection is called conjunctivitis. The most common symptoms of conjunctivitis include a thick, pus-like discharge from the eye, swollen eyelids, redness, eyelids sticking together upon awakening, and a gritty or scratchy feeling in the eye. Your infection was caused by bacteria. It may be treated with medicine. With treatment, the infection takes about 7 to 10 days to resolve.  Home care    Use prescribed antibiotic eye drops or ointment as directed to treat the infection.    Apply a warm compress (towel soaked in warm water) to the affected eye 3 to 4 times a day. Do this just before applying medicine to the eye.    Use a warm, wet cloth to wipe away crusting of the eyelids in the morning. This is caused by mucus drainage during the night. You may also use saline irrigating solution or artificial tears to rinse away mucus in the eye. Do not put a patch over the eye.    Wash your hands before and after touching the infected eye. This is to prevent spreading the infection to the other eye, and to other people. Don't share your towels or washcloths with others.    You may use acetaminophen or ibuprofen to control pain, unless another medicine was prescribed. (Note: If you have chronic liver or kidney  disease or have ever had a stomach ulcer or gastrointestinal bleeding, talk with your doctor before using these medicines.)    Don't wear contact lenses until your eyes have healed and all symptoms are gone.  Follow-up care  Follow up with your healthcare provider, or as advised.  When to seek medical advice  Call your healthcare provider right away if any of these occur:    Worsening vision    Increasing pain in the eye    Increasing swelling or redness of the eyelid    Redness spreading around the eye  Date Last Reviewed: 6/14/2015 2000-2017 [employer]. [address] [place], [place] [id number]. All rights reserved. This information is not intended as a substitute for professional medical care. Always follow your healthcare professional's instructions.                Follow-ups after your visit        Who to contact     If you have questions or need follow up information about today's clinic visit or your schedule please contact [facility] directly at [phone].  Normal or non-critical lab and imaging results will be communicated to you by MyChart, letter or phone within 4 business days after the clinic has received the results. If you do not hear from us within 7 days, please contact the clinic through [employer] or phone. If you have a critical or abnormal lab result, we will notify you by phone as soon as possible.  Submit refill requests through [employer] or call your pharmacy and they will forward the refill request to us. Please allow 3 business days for your refill to be completed.          Additional Information About Your Visit        [employer] Information     [employer] gives you secure access to your electronic health record. If you see a primary care provider, you can also send messages to your care team and make appointments. If you have questions, please call your primary care clinic.  If you do not have a primary care provider, please call [phone] and they will  assist you.        Care EveryWhere ID     This is your Care EveryWhere ID. This could be used by other organizations to access your [facility] medical records  [id number]        Your Vitals Were     Pulse Temperature Pulse Oximetry BMI (Body Mass Index)          92 98.5  F (36.9  C) (Oral) 97% 24.49 kg/m2         Blood Pressure from Last 3 Encounters:   11/12/17 141/85   09/01/17 133/81   11/25/16 125/84    Weight from Last 3 Encounters:   11/12/17 197 lb (89.4 kg)   09/01/17 201 lb (91.2 kg)   11/25/16 220 lb (99.8 kg)              We Performed the Following     *UA reflex to Microscopic and Culture ([facility] (except [place] and [place])     Urine Culture Aerobic Bacterial     Urine Microscopic          Today's Medication Changes          These changes are accurate as of: 11/12/17 11:49 AM.  If you have any questions, ask your nurse or doctor.               Start taking these medicines.        Dose/Directions    azithromycin 500 MG tablet   Commonly known as:  ZITHROMAX   Used for:  Urethritis   Started by:  Bert Ray MD        Dose:  1000 mg   Take 2 tablets (1,000 mg) by mouth once for 1 dose   Quantity:  2 tablet   Refills:  0       cefTRIAXone 250 MG injection   Commonly known as:  ROCEPHIN   Used for:  Urethritis   Started by:  Bert Ray MD        Dose:  250 mg   Inject 250 mg into the muscle once for 1 dose   Quantity:  1.25 mL   Refills:  0       gentamicin 0.3 % ophthalmic solution   Commonly known as:  GARAMYCIN   Used for:  Acute bacterial conjunctivitis of both eyes   Started by:  Bert Ray MD        Dose:  1 drop   Apply 1 drop to eye every 4 hours for 7 days   Quantity:  5 mL   Refills:  0            Where to get your medicines      These medications were sent to [facility] - [place], [place] - [address]  [address], [place] [address]     Phone:  [phone]     azithromycin 500 MG tablet    gentamicin 0.3 %  ophthalmic solution         Some of these will need a paper prescription and others can be bought over the counter.  Ask your nurse if you have questions.     You don't need a prescription for these medications     cefTRIAXone 250 MG injection                Primary Care Provider Office Phone # Fax #    Niya Jimenez PA-C [phone]       [address]  [facility] [address]        Equal Access to Services     AMPARO VELAZQUEZ : Hadii aad ku hadasho Soomaali, waaxda luqadaha, qaybta kaalmada adeegyada, waxay idiin hayaan adeeg kharash la'aan ah. So [facility] [phone].    ATENCIÓN: Si katelyn gill, tiene a barr disposición servicios gratuitos de asistencia lingüística. Llame al [phone].    We comply with applicable federal civil rights laws and [place] laws. We do not discriminate on the basis of race, color, national origin, age, disability, sex, sexual orientation, or gender identity.            Thank you!     Thank you for choosing [facility]  for your care. Our goal is always to provide you with excellent care. Hearing back from our patients is one way we can continue to improve our services. Please take a few minutes to complete the written survey that you may receive in the mail after your visit with us. Thank you!             Your Updated Medication List - Protect others around you: Learn how to safely use, store and throw away your medicines at www.disposemymeds.org.          This list is accurate as of: 11/12/17 11:49 AM.  Always use your most recent med list.                   Brand Name Dispense Instructions for use Diagnosis    azithromycin 500 MG tablet    ZITHROMAX    2 tablet    Take 2 tablets (1,000 mg) by mouth once for 1 dose    Urethritis       cefTRIAXone 250 MG injection    ROCEPHIN    1.25 mL    Inject 250 mg into the muscle once for 1 dose    Urethritis       gentamicin 0.3 % ophthalmic solution    GARAMYCIN    5 mL    Apply 1 drop to eye every 4  hours for 7 days    Acute bacterial conjunctivitis of both eyes       sertraline 50 MG tablet    ZOLOFT    60 tablet    Take 2 tablets (100 mg) by mouth daily    Moderate major depression (H)       trimethoprim-polymyxin b ophthalmic solution    POLYTRIM

## 2017-11-13 LAB
BACTERIA SPEC CULT: NORMAL
C TRACH DNA SPEC QL NAA+PROBE: NEGATIVE
HIV 1+2 AB+HIV1 P24 AG SERPL QL IA: NONREACTIVE
N GONORRHOEA DNA SPEC QL NAA+PROBE: NEGATIVE
SPECIMEN SOURCE: NORMAL
T PALLIDUM IGG+IGM SER QL: NEGATIVE

## 2017-11-29 ENCOUNTER — E-VISIT (OUTPATIENT)
Dept: FAMILY MEDICINE | Facility: CLINIC | Age: 32
End: 2017-11-29
Payer: COMMERCIAL

## 2017-11-29 DIAGNOSIS — F32.1 MODERATE MAJOR DEPRESSION (H): ICD-10-CM

## 2017-11-29 PROCEDURE — 98969 ZZC NONPHYSICIAN ONLINE ASSESSMENT AND MANAGEMENT: CPT | Performed by: PHYSICIAN ASSISTANT

## 2017-11-29 ASSESSMENT — PATIENT HEALTH QUESTIONNAIRE - PHQ9
SUM OF ALL RESPONSES TO PHQ QUESTIONS 1-9: 6
10. IF YOU CHECKED OFF ANY PROBLEMS, HOW DIFFICULT HAVE THESE PROBLEMS MADE IT FOR YOU TO DO YOUR WORK, TAKE CARE OF THINGS AT HOME, OR GET ALONG WITH OTHER PEOPLE: SOMEWHAT DIFFICULT
SUM OF ALL RESPONSES TO PHQ QUESTIONS 1-9: 6

## 2017-11-29 ASSESSMENT — ANXIETY QUESTIONNAIRES
GAD7 TOTAL SCORE: 3
7. FEELING AFRAID AS IF SOMETHING AWFUL MIGHT HAPPEN: NOT AT ALL
1. FEELING NERVOUS, ANXIOUS, OR ON EDGE: SEVERAL DAYS
3. WORRYING TOO MUCH ABOUT DIFFERENT THINGS: SEVERAL DAYS
GAD7 TOTAL SCORE: 3
5. BEING SO RESTLESS THAT IT IS HARD TO SIT STILL: NOT AT ALL
7. FEELING AFRAID AS IF SOMETHING AWFUL MIGHT HAPPEN: NOT AT ALL
2. NOT BEING ABLE TO STOP OR CONTROL WORRYING: NOT AT ALL
6. BECOMING EASILY ANNOYED OR IRRITABLE: SEVERAL DAYS
4. TROUBLE RELAXING: NOT AT ALL
GAD7 TOTAL SCORE: 3

## 2017-11-30 ENCOUNTER — MYC MEDICAL ADVICE (OUTPATIENT)
Dept: FAMILY MEDICINE | Facility: CLINIC | Age: 32
End: 2017-11-30

## 2017-11-30 RX ORDER — SERTRALINE HYDROCHLORIDE 100 MG/1
100 TABLET, FILM COATED ORAL DAILY
Qty: 90 TABLET | Refills: 1 | Status: SHIPPED | OUTPATIENT
Start: 2017-11-30 | End: 2018-06-19

## 2017-11-30 ASSESSMENT — ANXIETY QUESTIONNAIRES: GAD7 TOTAL SCORE: 3

## 2017-11-30 ASSESSMENT — PATIENT HEALTH QUESTIONNAIRE - PHQ9: SUM OF ALL RESPONSES TO PHQ QUESTIONS 1-9: 6

## 2018-06-19 ENCOUNTER — E-VISIT (OUTPATIENT)
Dept: FAMILY MEDICINE | Facility: CLINIC | Age: 33
End: 2018-06-19
Payer: COMMERCIAL

## 2018-06-19 DIAGNOSIS — F32.1 MODERATE MAJOR DEPRESSION (H): ICD-10-CM

## 2018-06-19 PROCEDURE — 99444 ZZC PHYSICIAN ONLINE EVALUATION & MANAGEMENT SERVICE: CPT | Performed by: PHYSICIAN ASSISTANT

## 2018-06-19 RX ORDER — SERTRALINE HYDROCHLORIDE 100 MG/1
100 TABLET, FILM COATED ORAL DAILY
Qty: 90 TABLET | Refills: 1 | Status: SHIPPED | OUTPATIENT
Start: 2018-06-19 | End: 2019-01-07

## 2018-06-19 ASSESSMENT — ANXIETY QUESTIONNAIRES
3. WORRYING TOO MUCH ABOUT DIFFERENT THINGS: SEVERAL DAYS
7. FEELING AFRAID AS IF SOMETHING AWFUL MIGHT HAPPEN: NOT AT ALL
5. BEING SO RESTLESS THAT IT IS HARD TO SIT STILL: NOT AT ALL
2. NOT BEING ABLE TO STOP OR CONTROL WORRYING: NOT AT ALL
GAD7 TOTAL SCORE: 3
GAD7 TOTAL SCORE: 3
4. TROUBLE RELAXING: NOT AT ALL
7. FEELING AFRAID AS IF SOMETHING AWFUL MIGHT HAPPEN: NOT AT ALL
1. FEELING NERVOUS, ANXIOUS, OR ON EDGE: SEVERAL DAYS
6. BECOMING EASILY ANNOYED OR IRRITABLE: SEVERAL DAYS
GAD7 TOTAL SCORE: 3

## 2018-06-19 ASSESSMENT — PATIENT HEALTH QUESTIONNAIRE - PHQ9
SUM OF ALL RESPONSES TO PHQ QUESTIONS 1-9: 4
10. IF YOU CHECKED OFF ANY PROBLEMS, HOW DIFFICULT HAVE THESE PROBLEMS MADE IT FOR YOU TO DO YOUR WORK, TAKE CARE OF THINGS AT HOME, OR GET ALONG WITH OTHER PEOPLE: NOT DIFFICULT AT ALL
SUM OF ALL RESPONSES TO PHQ QUESTIONS 1-9: 4

## 2018-06-20 ASSESSMENT — PATIENT HEALTH QUESTIONNAIRE - PHQ9: SUM OF ALL RESPONSES TO PHQ QUESTIONS 1-9: 4

## 2018-06-20 ASSESSMENT — ANXIETY QUESTIONNAIRES: GAD7 TOTAL SCORE: 3

## 2019-01-07 ENCOUNTER — MYC REFILL (OUTPATIENT)
Dept: FAMILY MEDICINE | Facility: CLINIC | Age: 34
End: 2019-01-07

## 2019-01-07 DIAGNOSIS — F32.1 MODERATE MAJOR DEPRESSION (H): ICD-10-CM

## 2019-01-08 RX ORDER — SERTRALINE HYDROCHLORIDE 100 MG/1
100 TABLET, FILM COATED ORAL DAILY
Qty: 30 TABLET | Refills: 0 | Status: SHIPPED | OUTPATIENT
Start: 2019-01-08

## 2019-01-08 NOTE — TELEPHONE ENCOUNTER
PHQ-9 SCORE 9/25/2017 11/29/2017 6/19/2018   PHQ-9 Total Score MyChart 14 (Moderate depression) 6 (Mild depression) 4 (Minimal depression)   PHQ-9 Total Score 14 6 4     Medication is being filled for 1 time refill only due to:   Over due for office visit and/or labs   DANNI Best  RN/Volodymyr Villatoro

## 2019-02-25 ENCOUNTER — MYC REFILL (OUTPATIENT)
Dept: FAMILY MEDICINE | Facility: CLINIC | Age: 34
End: 2019-02-25

## 2019-02-25 DIAGNOSIS — F32.1 MODERATE MAJOR DEPRESSION (H): ICD-10-CM

## 2019-02-25 RX ORDER — SERTRALINE HYDROCHLORIDE 100 MG/1
100 TABLET, FILM COATED ORAL DAILY
Qty: 30 TABLET | Refills: 0 | Status: CANCELLED | OUTPATIENT
Start: 2019-02-25

## 2019-02-27 ENCOUNTER — TELEPHONE (OUTPATIENT)
Dept: BEHAVIORAL HEALTH | Facility: CLINIC | Age: 34
End: 2019-02-27

## 2019-02-27 NOTE — TELEPHONE ENCOUNTER
[facility] PHQ-9 Follow-up  Behavioral Health Clinician Triage Service    [facility] PHQ-9 Responses:  [facility] Follow-up to PHQ 11/29/2017 6/19/2018 2/26/2019   PHQ-9 9. Suicide Ideation past 2 weeks Not at all Not at all Several days   Thoughts of suicide or self harm in past 2 weeks - - No   Thoughts of suicide or self harm in past 2 weeks - - No   PHQ-9 Safety concerns? - - No   PHQ-9 Safety concerns? - - No        1st Outreach Date: February 27, 2019 Time: 10:45am  Outcome: Left a message for patient to call [facility].  If patient doesn't return the call the [facility] Pool will make one more phone attempt within 24 hours.    Nathalie Kim   2nd Outreach Date: February 28, 2019 Time: 9:29am  Outcome: Left a message.  See disposition below.  Nathalie Kim     2 phone outreach attempts have been made with no response form patient.   Patient is not in imminent risk of harm or safety and chart review indicates high protective factors; indicated NO to safety follow up questions on [facility] PHQ-9 and refilled depression mediation 2/25/19.    No further action is needed at this time.   Routed outreach attempt to Primary Care Provider.

## 2019-09-06 ENCOUNTER — MYC REFILL (OUTPATIENT)
Dept: FAMILY MEDICINE | Facility: CLINIC | Age: 34
End: 2019-09-06

## 2019-09-06 DIAGNOSIS — F32.1 MODERATE MAJOR DEPRESSION (H): ICD-10-CM

## 2019-09-06 RX ORDER — SERTRALINE HYDROCHLORIDE 100 MG/1
100 TABLET, FILM COATED ORAL DAILY
Qty: 30 TABLET | Refills: 0 | Status: CANCELLED | OUTPATIENT
Start: 2019-09-06

## 2019-11-06 ENCOUNTER — HEALTH MAINTENANCE LETTER (OUTPATIENT)
Age: 34
End: 2019-11-06

## 2020-11-29 ENCOUNTER — HEALTH MAINTENANCE LETTER (OUTPATIENT)
Age: 35
End: 2020-11-29

## 2021-09-19 ENCOUNTER — HEALTH MAINTENANCE LETTER (OUTPATIENT)
Age: 36
End: 2021-09-19

## 2021-10-19 PROBLEM — F32.9 MAJOR DEPRESSION: Status: ACTIVE | Noted: 2017-09-01

## 2022-01-09 ENCOUNTER — HEALTH MAINTENANCE LETTER (OUTPATIENT)
Age: 37
End: 2022-01-09

## 2022-11-21 ENCOUNTER — HEALTH MAINTENANCE LETTER (OUTPATIENT)
Age: 37
End: 2022-11-21

## 2023-04-16 ENCOUNTER — HEALTH MAINTENANCE LETTER (OUTPATIENT)
Age: 38
End: 2023-04-16

## 2024-06-23 ENCOUNTER — HEALTH MAINTENANCE LETTER (OUTPATIENT)
Age: 39
End: 2024-06-23

## 2025-07-12 ENCOUNTER — HEALTH MAINTENANCE LETTER (OUTPATIENT)
Age: 40
End: 2025-07-12